# Patient Record
Sex: MALE | Race: OTHER | HISPANIC OR LATINO | Employment: FULL TIME | URBAN - METROPOLITAN AREA
[De-identification: names, ages, dates, MRNs, and addresses within clinical notes are randomized per-mention and may not be internally consistent; named-entity substitution may affect disease eponyms.]

---

## 2023-12-04 ENCOUNTER — HOSPITAL ENCOUNTER (EMERGENCY)
Facility: HOSPITAL | Age: 23
Discharge: HOME | End: 2023-12-04
Attending: STUDENT IN AN ORGANIZED HEALTH CARE EDUCATION/TRAINING PROGRAM

## 2023-12-04 VITALS
BODY MASS INDEX: 20.73 KG/M2 | HEART RATE: 83 BPM | SYSTOLIC BLOOD PRESSURE: 125 MMHG | OXYGEN SATURATION: 97 % | DIASTOLIC BLOOD PRESSURE: 55 MMHG | WEIGHT: 140 LBS | HEIGHT: 69 IN | TEMPERATURE: 99 F | RESPIRATION RATE: 14 BRPM

## 2023-12-04 DIAGNOSIS — J02.0 STREP PHARYNGITIS: Primary | ICD-10-CM

## 2023-12-04 LAB
CTP QC/QA: YES
MOLECULAR STREP A: POSITIVE
POC MOLECULAR INFLUENZA A AGN: NEGATIVE
POC MOLECULAR INFLUENZA B AGN: NEGATIVE
SARS-COV-2 RDRP RESP QL NAA+PROBE: NEGATIVE

## 2023-12-04 PROCEDURE — 87635 SARS-COV-2 COVID-19 AMP PRB: CPT | Performed by: NURSE PRACTITIONER

## 2023-12-04 PROCEDURE — 87651 STREP A DNA AMP PROBE: CPT

## 2023-12-04 PROCEDURE — 87502 INFLUENZA DNA AMP PROBE: CPT

## 2023-12-04 PROCEDURE — 25000003 PHARM REV CODE 250: Performed by: STUDENT IN AN ORGANIZED HEALTH CARE EDUCATION/TRAINING PROGRAM

## 2023-12-04 PROCEDURE — 99284 EMERGENCY DEPT VISIT MOD MDM: CPT

## 2023-12-04 RX ORDER — CEPHALEXIN 500 MG/1
500 CAPSULE ORAL 2 TIMES DAILY
Qty: 20 CAPSULE | Refills: 0 | Status: SHIPPED | OUTPATIENT
Start: 2023-12-04 | End: 2023-12-14

## 2023-12-04 RX ORDER — NAPROXEN 500 MG/1
500 TABLET ORAL EVERY 12 HOURS PRN
Qty: 14 TABLET | Refills: 0 | Status: SHIPPED | OUTPATIENT
Start: 2023-12-04 | End: 2023-12-11

## 2023-12-04 RX ORDER — NAPROXEN 500 MG/1
500 TABLET ORAL
Status: COMPLETED | OUTPATIENT
Start: 2023-12-04 | End: 2023-12-04

## 2023-12-04 RX ADMIN — NAPROXEN 500 MG: 500 TABLET ORAL at 11:12

## 2023-12-04 NOTE — Clinical Note
"Aleksey Nevarez" Sunil Ch was seen and treated in our emergency department on 12/4/2023.  He may return to work on 12/06/2023.       If you have any questions or concerns, please don't hesitate to call.      Niesha Khan RN    "

## 2023-12-05 NOTE — ED TRIAGE NOTES
Patient presents to the ED c/o sore throat and lightheadedness ongoing over the past 2 days. Pt reports attempting treating s/s w/ Tylenol and NyQuil w/ last dose yesterday w/ some relief. Pt endorses sick contacts in the home w/ recent flu dx. Denies fevers, chills, N/V/D

## 2023-12-05 NOTE — ED NOTES
Bed: 08main  Expected date:   Expected time:   Means of arrival: Personal Transportation  Comments:

## 2023-12-05 NOTE — ED PROVIDER NOTES
Encounter Date: 12/4/2023    SCRIBE #1 NOTE: I, Yves Larson, am scribing for, and in the presence of,  Dave Heck MD.       History     Chief Complaint   Patient presents with    General Illness     Pt presents to the ED with c/o subjective fever x2 days and one episode of emesis 1 hour ago and is still nauseated. Last took nyquil one hour ago. States rest of his family at home is sick and members have been dx with flu     Aleksey Ch Jr. is a 23 y.o. male with no known PMHx, who presents to the ED for evaluation of a sore throat beginning 2 days ago. Patient reports complaints of a sore throat for 2 days, noting associated fatigue, lightheadedness, cough, and generalized body aches. He notes attempting treatment with nyquil and tylneol with no immediate relief noted. He reports recent sick contact. No other medications taken PTA. No alleviating or exacerbating factors noted. Denies fever, chills, abdominal pain, nausea, vomiting, diarrhea, or other associated symptoms. NKDA.     The history is provided by the patient. No  was used.     Review of patient's allergies indicates:  No Known Allergies  History reviewed. No pertinent past medical history.  History reviewed. No pertinent surgical history.  History reviewed. No pertinent family history.  Social History     Tobacco Use    Smoking status: Never    Smokeless tobacco: Never   Substance Use Topics    Alcohol use: Yes     Comment: socially    Drug use: Never     Review of Systems   Constitutional:  Positive for fatigue. Negative for chills and fever.   HENT:  Positive for sore throat. Negative for facial swelling.    Eyes:  Negative for visual disturbance.   Respiratory:  Positive for cough. Negative for shortness of breath.    Cardiovascular:  Negative for chest pain and palpitations.   Gastrointestinal:  Negative for abdominal pain, nausea and vomiting.   Genitourinary:  Negative for dysuria and hematuria.    Musculoskeletal:  Positive for myalgias. Negative for back pain.   Skin:  Negative for rash.   Neurological:  Positive for light-headedness. Negative for weakness and headaches.   Hematological:  Does not bruise/bleed easily.   Psychiatric/Behavioral: Negative.         Physical Exam     Initial Vitals [12/04/23 2038]   BP Pulse Resp Temp SpO2   124/75 81 16 98.1 °F (36.7 °C) 95 %      MAP       --         Physical Exam    Nursing note and vitals reviewed.  Constitutional: He appears well-developed and well-nourished. He is not diaphoretic. No distress.   HENT:   Head: Normocephalic and atraumatic.   Right Ear: External ear normal.   Left Ear: External ear normal.   Nose: Nose normal.   Mouth/Throat: Posterior oropharyngeal edema present. No oropharyngeal exudate or posterior oropharyngeal erythema.   Posterior oropharynx is symmetrical. Palatal petechiae noted.   Eyes: Conjunctivae are normal. No scleral icterus.   Neck: Neck supple. No tracheal deviation present.   Cardiovascular:  Normal rate, regular rhythm and normal heart sounds.     Exam reveals no gallop and no friction rub.       No murmur heard.  Pulmonary/Chest: Breath sounds normal. No respiratory distress.   Abdominal: Abdomen is soft. Bowel sounds are normal. There is no abdominal tenderness.   Musculoskeletal:      Cervical back: Neck supple.     Lymphadenopathy:     He has no cervical adenopathy.   Neurological: He is alert and oriented to person, place, and time. GCS score is 15. GCS eye subscore is 4. GCS verbal subscore is 5. GCS motor subscore is 6.   Skin: Skin is warm and dry.   Psychiatric: He has a normal mood and affect. Thought content normal.         ED Course   Procedures  Labs Reviewed   POCT STREP A MOLECULAR - Abnormal; Notable for the following components:       Result Value    Molecular Strep A, POC Positive (*)     All other components within normal limits   SARS-COV-2 RDRP GENE   POCT INFLUENZA A/B MOLECULAR          Imaging  Results    None          Medications   naproxen tablet 500 mg (500 mg Oral Given 12/4/23 5269)     Medical Decision Making  Amount and/or Complexity of Data Reviewed  Labs: ordered.    Risk  OTC drugs.  Prescription drug management.            Scribe Attestation:   Scribe #1: I performed the above scribed service and the documentation accurately describes the services I performed. I attest to the accuracy of the note.        ED Course as of 12/05/23 0151   Mon Dec 04, 2023   2253 Differential diagnosis includes but not limited to viral pharyngitis, streptococcal pharyngitis, viral syndrome, PTA [CC]   2253 23-year-old presenting to the emergency department for evaluation of sore throat.  He notes lightheadedness and generalized weakness.  He notes myalgias.  He denies fever, chills, shortness of breath, chest pain.  He notes mild cough.  Influenza COVID are negative.  Doubt these etiologies.  Rapid strep is positive.  Palatal petechiae noted erythematous posterior oropharynx.  I am concerned for streptococcal pharyngitis.  Plan to treat with antibiotics and have patient follow up in 7 days for re-evaluation.  No evidence of PTA on exam.  Normal phonation, symmetrical posterior oropharynx.  Patient otherwise looks well with normal vitals.  He is not appear toxic.  Strict return precautions given. I discussed with the patient/family the diagnosis, treatment plan, indications for return to the emergency department, and for expected follow-up. The patient/family verbalized an understanding. The patient/family is asked if there are any questions or concerns. We discuss the case, until all issues are addressed to the patient/family's satisfaction. Patient/family understands and is agreeable to the plan. Patient is stable and ready for discharge.      [CC]      ED Course User Index  [CC] Dave Heck MD                         IDave MD, personally performed the services described in this  documentation. All medical record entries made by the scribe were at my direction and in my presence. I have reviewed the chart and agree that the record reflects my personal performance and is accurate and complete.    Clinical Impression:  Final diagnoses:  [J02.0] Strep pharyngitis (Primary)          ED Disposition Condition    Discharge Stable          ED Prescriptions       Medication Sig Dispense Start Date End Date Auth. Provider    benzocaine-menthoL 15-3.6 mg Lozg 1 lozenge by Mucous Membrane route every 4 (four) hours as needed (sore throat). 18 lozenge 12/4/2023 -- Dave Heck MD    naproxen (NAPROSYN) 500 MG tablet Take 1 tablet (500 mg total) by mouth every 12 (twelve) hours as needed (pain). Take with meals. 14 tablet 12/4/2023 12/11/2023 Dave Heck MD    cephALEXin (KEFLEX) 500 MG capsule Take 1 capsule (500 mg total) by mouth 2 (two) times a day. for 10 days 20 capsule 12/4/2023 12/14/2023 Dave Heck MD          Follow-up Information       Follow up With Specialties Details Why Contact Info    [facility] - Emergency Dept Emergency Medicine Go to  If symptoms worsen [address] [facility] [place] [address] [phone]    Your PCP  Schedule an appointment as soon as possible for a visit in 2 days               Dave Heck MD  12/05/23 0151

## 2023-12-13 ENCOUNTER — HOSPITAL ENCOUNTER (EMERGENCY)
Facility: HOSPITAL | Age: 23
Discharge: HOME | End: 2023-12-14
Attending: EMERGENCY MEDICINE

## 2023-12-13 DIAGNOSIS — J06.9 VIRAL URI: Primary | ICD-10-CM

## 2023-12-13 DIAGNOSIS — M76.61 ACHILLES TENDINITIS OF RIGHT LOWER EXTREMITY: ICD-10-CM

## 2023-12-13 LAB
CTP QC/QA: YES
MOLECULAR STREP A: NEGATIVE
POC MOLECULAR INFLUENZA A AGN: NEGATIVE
POC MOLECULAR INFLUENZA B AGN: NEGATIVE
SARS-COV-2 RDRP RESP QL NAA+PROBE: NEGATIVE

## 2023-12-13 PROCEDURE — 87880 STREP A ASSAY W/OPTIC: CPT

## 2023-12-13 PROCEDURE — 87502 INFLUENZA DNA AMP PROBE: CPT

## 2023-12-13 PROCEDURE — 25000003 PHARM REV CODE 250

## 2023-12-13 PROCEDURE — 25000003 PHARM REV CODE 250: Performed by: STUDENT IN AN ORGANIZED HEALTH CARE EDUCATION/TRAINING PROGRAM

## 2023-12-13 PROCEDURE — 87635 SARS-COV-2 COVID-19 AMP PRB: CPT

## 2023-12-13 PROCEDURE — 99284 EMERGENCY DEPT VISIT MOD MDM: CPT

## 2023-12-13 RX ORDER — ONDANSETRON HYDROCHLORIDE 4 MG/5ML
4 SOLUTION ORAL ONCE
Status: DISCONTINUED | OUTPATIENT
Start: 2023-12-13 | End: 2023-12-13

## 2023-12-13 RX ORDER — ONDANSETRON 4 MG/1
4 TABLET, ORALLY DISINTEGRATING ORAL
Status: COMPLETED | OUTPATIENT
Start: 2023-12-13 | End: 2023-12-13

## 2023-12-13 RX ORDER — IBUPROFEN 600 MG/1
600 TABLET ORAL
Status: COMPLETED | OUTPATIENT
Start: 2023-12-13 | End: 2023-12-13

## 2023-12-13 RX ADMIN — ONDANSETRON 4 MG: 4 TABLET, ORALLY DISINTEGRATING ORAL at 10:12

## 2023-12-13 RX ADMIN — IBUPROFEN 600 MG: 600 TABLET ORAL at 10:12

## 2023-12-13 NOTE — Clinical Note
"Aleksey Nevarez" Sunil Ch was seen and treated in our emergency department on 12/13/2023.  He may return to work on 12/18/2023.       If you have any questions or concerns, please don't hesitate to call.      Pedro Pablo De La Rosa MD"

## 2023-12-14 VITALS
TEMPERATURE: 98 F | HEART RATE: 63 BPM | SYSTOLIC BLOOD PRESSURE: 111 MMHG | BODY MASS INDEX: 20.67 KG/M2 | WEIGHT: 140 LBS | RESPIRATION RATE: 16 BRPM | DIASTOLIC BLOOD PRESSURE: 56 MMHG | OXYGEN SATURATION: 97 %

## 2023-12-14 RX ORDER — ONDANSETRON 4 MG/1
4 TABLET, ORALLY DISINTEGRATING ORAL EVERY 8 HOURS PRN
Qty: 20 TABLET | Refills: 0 | Status: SHIPPED | OUTPATIENT
Start: 2023-12-14

## 2023-12-14 RX ORDER — ACETAMINOPHEN 500 MG
1000 TABLET ORAL EVERY 6 HOURS PRN
Qty: 60 TABLET | Refills: 0 | Status: SHIPPED | OUTPATIENT
Start: 2023-12-14

## 2023-12-14 RX ORDER — IBUPROFEN 600 MG/1
600 TABLET ORAL EVERY 6 HOURS PRN
Qty: 20 TABLET | Refills: 0 | Status: SHIPPED | OUTPATIENT
Start: 2023-12-14

## 2023-12-14 NOTE — ED NOTES
Bed: 02hall  Expected date:   Expected time:   Means of arrival: Personal Transportation  Comments:

## 2023-12-14 NOTE — ED PROVIDER NOTES
Encounter Date: 12/13/2023       History     Chief Complaint   Patient presents with    Vomiting     Starting yesterday and felt hot but did not check temp,     Leg Pain     Rt calf pain started today, denies injury, no swelling noted, ambulatory to triage     HPI    23-year-old male with no reported past medical history presents with right leg pain, vomiting and congestion since yesterday.  Patient reports some chills, states that he vomited a lot multiple times yesterday additionally this morning.  Reports he has not vomited again since then.  He does reports some loose bowel movements like diarrhea earlier today, no blood noted.  He reports some congestion, cough in his concern that he has a flu as he has not been feeling well for a few days now.  Reports no recent ill contacts he knows about.  Additionally while patient was walking upstairs today he noted immediate right calf pain in the middle of his calf without swelling, states that it is extremely difficult to flex or extend his right ankle and he has some tenderness throughout his Achilles.  Reports no history is with this previously, denies having changes foot where recently, denies any traumatic injury.  Denies any further complaints.    Review of patient's allergies indicates:   Allergen Reactions    Shrimp Nausea And Vomiting and Swelling     History reviewed. No pertinent past medical history.  History reviewed. No pertinent surgical history.  History reviewed. No pertinent family history.  Social History     Tobacco Use    Smoking status: Never    Smokeless tobacco: Never   Substance Use Topics    Alcohol use: Yes     Comment: socially    Drug use: Never     Review of Systems   Constitutional:  Positive for chills.   HENT:  Positive for congestion.    Eyes: Negative.    Respiratory:  Positive for cough.    Cardiovascular: Negative.    Gastrointestinal:  Positive for diarrhea, nausea and vomiting.   Genitourinary: Negative.    Musculoskeletal:  Negative.    Skin: Negative.    Neurological: Negative.        Physical Exam     Initial Vitals [12/13/23 2148]   BP Pulse Resp Temp SpO2   (!) 115/55 72 16 97.9 °F (36.6 °C) 97 %      MAP       --         Physical Exam    Nursing note and vitals reviewed.  Constitutional: He appears well-developed and well-nourished. He is not diaphoretic. No distress.   HENT:   Head: Normocephalic and atraumatic.   Nose: Nose normal.   Mouth/Throat: No oropharyngeal exudate.   Boggy nasal mucosa.   Eyes: EOM are normal. Pupils are equal, round, and reactive to light.   Neck: Neck supple. No tracheal deviation present. No JVD present.   Normal range of motion.  Cardiovascular:  Normal rate, regular rhythm, normal heart sounds and intact distal pulses.           Pulmonary/Chest: Breath sounds normal. No respiratory distress. He has no wheezes. He has no rhonchi. He has no rales.   Abdominal: Abdomen is soft. Bowel sounds are normal. He exhibits no distension. There is no abdominal tenderness. There is no rebound and no guarding.   Musculoskeletal:         General: Tenderness (tenderness to need right posterior calf extending down throughout Achilles tendon, negative Homans, 2+ DP pulses noted.) present. No edema. Normal range of motion.      Cervical back: Normal range of motion and neck supple.     Neurological: He is alert and oriented to person, place, and time. He has normal strength.   Skin: Skin is warm and dry. Capillary refill takes less than 2 seconds. No rash noted. No erythema.         ED Course   Procedures  Labs Reviewed   POCT INFLUENZA A/B MOLECULAR   SARS-COV-2 RDRP GENE   POCT STREP A MOLECULAR          Imaging Results    None          Medications   ibuprofen tablet 600 mg (600 mg Oral Given 12/13/23 2230)   ondansetron disintegrating tablet 4 mg (4 mg Oral Given 12/13/23 2230)     Medical Decision Making  Risk  OTC drugs.  Prescription drug management.      MDM:    23-year-old male with no reported past medical  history presents with right leg pain, vomiting and congestion since yesterday.  Physical exam as noted above.  ED workup notable for flu negative, strep negative, COVID negative.  Differential Diagnosis includes:  Achilles tendon rupture, DVT, pneumonia, appendicitis, sepsis.  Patient presentation consistent with viral URI negative viral swabs as noted above, constellation of symptoms appears more consistent with possible influenza versus viral URI.  He is tolerating oral liquids and fluids time, stable vitals.  Will continue supportive care.  Additionally patient noted to have significant tenderness throughout his right Achilles tendon with extremely difficult dorsal flexion and plantar flexion of the right ankle without significant overlying skin changes or significant edema noted.  EHL and FHL are intact, sensation is intact, he is neurovascularly intact distally in the right foot.  Will continue supportive care of a suspected Achilles tendinopathy on the right side.  Do not suspect any additional surgical or medical emergency. Discussed diagnosis and further treatment with patient, including f/u.  Return precautions given and all questions answered.  Patient in understanding of plan.  Pt discharged to home improved and stable.        Note was created using voice recognition software. Note may have occasional typographical or grammatical errors, garbled syntax, and other bizarre constructions that may not have been identified and edited despite good yeny initial review prior to signing.                                         Clinical Impression:  Final diagnoses:  [J06.9] Viral URI (Primary)  [M76.61] Achilles tendinitis of right lower extremity          ED Disposition Condition    Discharge Stable          ED Prescriptions       Medication Sig Dispense Start Date End Date Auth. Provider    ibuprofen (ADVIL,MOTRIN) 600 MG tablet Take 1 tablet (600 mg total) by mouth every 6 (six) hours as needed for Pain. 20  tablet 12/14/2023 -- Pedro Pablo De La Rosa MD    acetaminophen (TYLENOL) 500 MG tablet Take 2 tablets (1,000 mg total) by mouth every 6 (six) hours as needed. 60 tablet 12/14/2023 -- Pedro Pablo De La Rosa MD    ondansetron (ZOFRAN-ODT) 4 MG TbDL Take 1 tablet (4 mg total) by mouth every 8 (eight) hours as needed. 20 tablet 12/14/2023 -- Pedro Pablo De La Rosa MD          Follow-up Information       Follow up With Specialties Details Why Contact Info    [facility] - Emergency Dept Emergency Medicine Go to  If symptoms worsen [address] [facility] [place] [id number] [phone]    [facility]  Go in 1 week As needed [address] [place] [address]  [phone]      [place] [place] [facility] - Orthopedic Surgery, Physical Therapy   [address] [facility] [address]  [phone]               Pedro Pablo De La Rosa MD  12/14/23 0872

## 2023-12-14 NOTE — ED TRIAGE NOTES
Pt. Arrives to ED with complaints of right calf pain that started today, denying any recent injuries to the area, pain worse when ambulating. Also complaining of fever for the last 3-4 days, reports taking nyquil with no improvement of symptoms. Denies sick contacts.

## 2024-03-11 ENCOUNTER — HOSPITAL ENCOUNTER (EMERGENCY)
Facility: HOSPITAL | Age: 24
Discharge: HOME | End: 2024-03-11
Attending: EMERGENCY MEDICINE

## 2024-03-11 VITALS
SYSTOLIC BLOOD PRESSURE: 130 MMHG | HEART RATE: 75 BPM | DIASTOLIC BLOOD PRESSURE: 70 MMHG | OXYGEN SATURATION: 99 % | RESPIRATION RATE: 19 BRPM | BODY MASS INDEX: 21.62 KG/M2 | HEIGHT: 69 IN | TEMPERATURE: 98 F | WEIGHT: 146 LBS

## 2024-03-11 DIAGNOSIS — M76.60 PAIN IN ACHILLES TENDON: Primary | ICD-10-CM

## 2024-03-11 DIAGNOSIS — S99.911A RIGHT ANKLE INJURY: ICD-10-CM

## 2024-03-11 DIAGNOSIS — S90.01XA CONTUSION OF RIGHT ANKLE, INITIAL ENCOUNTER: ICD-10-CM

## 2024-03-11 PROCEDURE — 63600175 PHARM REV CODE 636 W HCPCS: Performed by: NURSE PRACTITIONER

## 2024-03-11 PROCEDURE — 99284 EMERGENCY DEPT VISIT MOD MDM: CPT | Mod: 25

## 2024-03-11 PROCEDURE — 96372 THER/PROPH/DIAG INJ SC/IM: CPT | Performed by: NURSE PRACTITIONER

## 2024-03-11 RX ORDER — DEXAMETHASONE SODIUM PHOSPHATE 4 MG/ML
12 INJECTION, SOLUTION INTRA-ARTICULAR; INTRALESIONAL; INTRAMUSCULAR; INTRAVENOUS; SOFT TISSUE
Status: COMPLETED | OUTPATIENT
Start: 2024-03-11 | End: 2024-03-11

## 2024-03-11 RX ORDER — NAPROXEN 500 MG/1
500 TABLET ORAL EVERY 12 HOURS PRN
Qty: 20 TABLET | Refills: 0 | Status: SHIPPED | OUTPATIENT
Start: 2024-03-11

## 2024-03-11 RX ADMIN — DEXAMETHASONE SODIUM PHOSPHATE 12 MG: 4 INJECTION, SOLUTION INTRAMUSCULAR; INTRAVENOUS at 07:03

## 2024-03-11 NOTE — Clinical Note
"Aleksey Nevarez" Sunil Ch was seen and treated in our emergency department on 3/11/2024.  He may return to work on 03/15/2024.       If you have any questions or concerns, please don't hesitate to call.      Sincere Glez, NP"

## 2024-03-11 NOTE — ED PROVIDER NOTES
Encounter Date: 3/11/2024       History     Chief Complaint   Patient presents with    Ankle Pain     Patient reports right ankle pain, states did get hit by a pipe 2 weeks ago but didn't bother him, states pain has progressed and getting worse.      Year old male presenting for evaluation of right ankle pain.  States he was struck by a pipe in the ankle while at work several weeks ago but did not have any pain following this incident.  He is uncertain if that incident is related to his current pain.  Denies any other injuries.  No medications or treatments attempted prior to arrival.  Denies pain in the foot, calf, knee.    The history is provided by the patient. No  was used.     Review of patient's allergies indicates:   Allergen Reactions    Shrimp Nausea And Vomiting and Swelling     No past medical history on file.  No past surgical history on file.  No family history on file.  Social History     Tobacco Use    Smoking status: Never    Smokeless tobacco: Never   Substance Use Topics    Alcohol use: Yes     Comment: socially    Drug use: Never     Review of Systems   Constitutional:  Negative for chills and fever.   HENT:  Negative for congestion, postnasal drip, rhinorrhea, sinus pressure and sore throat.    Eyes:  Negative for pain and redness.   Respiratory:  Negative for apnea, cough, choking, chest tightness, shortness of breath, wheezing and stridor.    Cardiovascular:  Negative for chest pain, palpitations and leg swelling.   Gastrointestinal:  Negative for abdominal pain, diarrhea, nausea and vomiting.   Genitourinary:  Negative for dysuria, flank pain, penile pain and urgency.   Musculoskeletal:  Positive for arthralgias. Negative for back pain, gait problem, joint swelling, myalgias, neck pain and neck stiffness.   Skin:  Negative for color change, pallor, rash and wound.   Neurological:  Negative for dizziness, tremors, seizures, syncope and light-headedness.    Psychiatric/Behavioral:  Negative for confusion. The patient is not nervous/anxious.        Physical Exam     Initial Vitals [03/11/24 1717]   BP Pulse Resp Temp SpO2   127/68 77 18 97.6 °F (36.4 °C) 97 %      MAP       --         Physical Exam    Nursing note and vitals reviewed.  Constitutional: He appears well-developed and well-nourished. He is not diaphoretic. No distress.   HENT:   Head: Normocephalic and atraumatic.   Right Ear: External ear normal.   Left Ear: External ear normal.   Nose: Nose normal.   Eyes: EOM are normal. Right eye exhibits no discharge. Left eye exhibits no discharge.   Neck: Neck supple. No tracheal deviation present.   Normal range of motion.  Cardiovascular:  Normal rate.           Pulmonary/Chest: No stridor. No respiratory distress.   Abdominal: Abdomen is soft. He exhibits no distension. There is no abdominal tenderness.   Musculoskeletal:         General: Tenderness present. Normal range of motion.      Cervical back: Normal range of motion and neck supple.      Comments: Active range of motion of the right ankle fully intact.  Pain is worse with passive dorsiflexion.  Pain is in the posterior ankle.  There is tenderness overlying the Achilles tendon.  No evidence of Achilles tendon rupture.  No erythema, warmth, swelling, bruising, deformity.  DP pulse 2 +.     Neurological: He is alert and oriented to person, place, and time. He has normal strength. No cranial nerve deficit.   Skin: Skin is warm and dry.   Psychiatric: He has a normal mood and affect. His behavior is normal. Judgment and thought content normal.         ED Course   Procedures  Labs Reviewed - No data to display       Imaging Results              X-Ray Ankle Complete Right (Final result)  Result time 03/11/24 18:28:46      Final result by Carl Pittman MD (03/11/24 18:28:46)                   Impression:      No acute osseous abnormality identified.      Electronically signed by: Carl Pittman  MD  Date:    03/11/2024  Time:    18:28               Narrative:    EXAMINATION:  XR ANKLE COMPLETE 3 VIEW RIGHT    CLINICAL HISTORY:  Unspecified injury of right ankle, initial encounter    TECHNIQUE:  AP, lateral, and oblique images of the right ankle were performed.    COMPARISON:  None    FINDINGS:  No evidence of acute displaced fracture, dislocation, or osseous destructive process.  Ankle mortise is maintained.                                       Medications   dexAMETHasone injection 12 mg (12 mg Intramuscular Given 3/11/24 1900)     Medical Decision Making  X-rays unremarkable.  Findings consistent with Achilles tendinitis.  Will treat symptomatically.  Educated on the condition.  Follow up with PCP/Orthopedics as needed.  ED return precautions given.  Shared decision-making with the patient.    Amount and/or Complexity of Data Reviewed  Radiology: ordered. Decision-making details documented in ED Course.    Risk  Prescription drug management.                                      Clinical Impression:  Final diagnoses:  [S99.911A] Right ankle injury  [M76.60] Pain in Achilles tendon (Primary)  [S90.01XA] Contusion of right ankle, initial encounter          ED Disposition Condition    Discharge Stable          ED Prescriptions       Medication Sig Dispense Start Date End Date Auth. Provider    naproxen (NAPROSYN) 500 MG tablet Take 1 tablet (500 mg total) by mouth every 12 (twelve) hours as needed (Pain). 20 tablet 3/11/2024 -- Sincere Glez, SANDRA          Follow-up Information       Follow up With Specialties Details Why Contact [facility] Emergency Dept Emergency Medicine Go to  If symptoms worsen, As needed [address] [facility] [place] [id number] [phone]             Sincere Glez, SANDRA  03/23/24 3391

## 2024-03-11 NOTE — DISCHARGE INSTRUCTIONS
Return in 1 week for ultrasound to rule out blood clot if pain has not improved.  Return sooner for any new or worsening symptoms.    Thank you for coming to our Emergency Department today. It is important to remember that some problems are difficult to diagnose and may not be found during your first visit. Be sure to follow up with your primary care doctor.  If you do not have one, you may contact the one listed on your discharge paperwork or you may also call the [facility] Appointment Desk at [phone] to schedule an appointment with one.     Return to the ER with any questions/concerns, new/concerning symptoms, worsening or failure to improve. Do not drive or make any important decisions for 24 hours if you have received any pain medications, sedatives or mood altering drugs during your ER visit.

## 2024-03-17 ENCOUNTER — HOSPITAL ENCOUNTER (EMERGENCY)
Facility: HOSPITAL | Age: 24
Discharge: HOME | End: 2024-03-17
Attending: EMERGENCY MEDICINE

## 2024-03-17 VITALS
BODY MASS INDEX: 20.73 KG/M2 | RESPIRATION RATE: 16 BRPM | HEART RATE: 70 BPM | SYSTOLIC BLOOD PRESSURE: 119 MMHG | OXYGEN SATURATION: 100 % | TEMPERATURE: 98 F | WEIGHT: 140 LBS | HEIGHT: 69 IN | DIASTOLIC BLOOD PRESSURE: 75 MMHG

## 2024-03-17 DIAGNOSIS — A08.4 VIRAL GASTROENTERITIS: Primary | ICD-10-CM

## 2024-03-17 DIAGNOSIS — R19.7 NAUSEA, VOMITING, AND DIARRHEA: ICD-10-CM

## 2024-03-17 DIAGNOSIS — R11.2 NAUSEA, VOMITING, AND DIARRHEA: ICD-10-CM

## 2024-03-17 LAB
ALBUMIN SERPL BCP-MCNC: 4.9 G/DL (ref 3.5–5.2)
ALP SERPL-CCNC: 51 U/L (ref 55–135)
ALT SERPL W/O P-5'-P-CCNC: 21 U/L (ref 10–44)
ANION GAP SERPL CALC-SCNC: 11 MMOL/L (ref 8–16)
AST SERPL-CCNC: 22 U/L (ref 10–40)
BASOPHILS # BLD AUTO: 0.04 K/UL (ref 0–0.2)
BASOPHILS NFR BLD: 0.5 % (ref 0–1.9)
BILIRUB SERPL-MCNC: 0.6 MG/DL (ref 0.1–1)
BILIRUB UR QL STRIP: NEGATIVE
BUN SERPL-MCNC: 16 MG/DL (ref 6–20)
CALCIUM SERPL-MCNC: 10 MG/DL (ref 8.7–10.5)
CHLORIDE SERPL-SCNC: 107 MMOL/L (ref 95–110)
CLARITY UR: CLEAR
CO2 SERPL-SCNC: 23 MMOL/L (ref 23–29)
COLOR UR: YELLOW
CREAT SERPL-MCNC: 0.8 MG/DL (ref 0.5–1.4)
CTP QC/QA: YES
CTP QC/QA: YES
DIFFERENTIAL METHOD BLD: ABNORMAL
EOSINOPHIL # BLD AUTO: 0 K/UL (ref 0–0.5)
EOSINOPHIL NFR BLD: 0.1 % (ref 0–8)
ERYTHROCYTE [DISTWIDTH] IN BLOOD BY AUTOMATED COUNT: 13.3 % (ref 11.5–14.5)
EST. GFR  (NO RACE VARIABLE): >60 ML/MIN/1.73 M^2
GLUCOSE SERPL-MCNC: 95 MG/DL (ref 70–110)
GLUCOSE UR QL STRIP: NEGATIVE
HCT VFR BLD AUTO: 45.5 % (ref 40–54)
HGB BLD-MCNC: 16.1 G/DL (ref 14–18)
HGB UR QL STRIP: NEGATIVE
IMM GRANULOCYTES # BLD AUTO: 0.02 K/UL (ref 0–0.04)
IMM GRANULOCYTES NFR BLD AUTO: 0.2 % (ref 0–0.5)
KETONES UR QL STRIP: ABNORMAL
LEUKOCYTE ESTERASE UR QL STRIP: NEGATIVE
LIPASE SERPL-CCNC: 11 U/L (ref 4–60)
LYMPHOCYTES # BLD AUTO: 1.2 K/UL (ref 1–4.8)
LYMPHOCYTES NFR BLD: 14.5 % (ref 18–48)
MCH RBC QN AUTO: 28.9 PG (ref 27–31)
MCHC RBC AUTO-ENTMCNC: 35.4 G/DL (ref 32–36)
MCV RBC AUTO: 82 FL (ref 82–98)
MONOCYTES # BLD AUTO: 0.5 K/UL (ref 0.3–1)
MONOCYTES NFR BLD: 6.6 % (ref 4–15)
NEUTROPHILS # BLD AUTO: 6.3 K/UL (ref 1.8–7.7)
NEUTROPHILS NFR BLD: 78.1 % (ref 38–73)
NITRITE UR QL STRIP: NEGATIVE
NRBC BLD-RTO: 0 /100 WBC
PH UR STRIP: 8 [PH] (ref 5–8)
PLATELET # BLD AUTO: 342 K/UL (ref 150–450)
PMV BLD AUTO: 11.1 FL (ref 9.2–12.9)
POC MOLECULAR INFLUENZA A AGN: NEGATIVE
POC MOLECULAR INFLUENZA B AGN: NEGATIVE
POTASSIUM SERPL-SCNC: 4.2 MMOL/L (ref 3.5–5.1)
PROT SERPL-MCNC: 8.2 G/DL (ref 6–8.4)
PROT UR QL STRIP: ABNORMAL
RBC # BLD AUTO: 5.58 M/UL (ref 4.6–6.2)
SARS-COV-2 RDRP RESP QL NAA+PROBE: NEGATIVE
SODIUM SERPL-SCNC: 141 MMOL/L (ref 136–145)
SP GR UR STRIP: 1.02 (ref 1–1.03)
URN SPEC COLLECT METH UR: ABNORMAL
UROBILINOGEN UR STRIP-ACNC: NEGATIVE EU/DL
WBC # BLD AUTO: 8.02 K/UL (ref 3.9–12.7)

## 2024-03-17 PROCEDURE — 87635 SARS-COV-2 COVID-19 AMP PRB: CPT | Performed by: NURSE PRACTITIONER

## 2024-03-17 PROCEDURE — 63600175 PHARM REV CODE 636 W HCPCS: Performed by: NURSE PRACTITIONER

## 2024-03-17 PROCEDURE — 87502 INFLUENZA DNA AMP PROBE: CPT

## 2024-03-17 PROCEDURE — 85025 COMPLETE CBC W/AUTO DIFF WBC: CPT | Performed by: NURSE PRACTITIONER

## 2024-03-17 PROCEDURE — 25000003 PHARM REV CODE 250: Performed by: NURSE PRACTITIONER

## 2024-03-17 PROCEDURE — 81003 URINALYSIS AUTO W/O SCOPE: CPT | Performed by: NURSE PRACTITIONER

## 2024-03-17 PROCEDURE — 99284 EMERGENCY DEPT VISIT MOD MDM: CPT | Mod: 25

## 2024-03-17 PROCEDURE — 80053 COMPREHEN METABOLIC PANEL: CPT | Performed by: NURSE PRACTITIONER

## 2024-03-17 PROCEDURE — 83690 ASSAY OF LIPASE: CPT | Performed by: NURSE PRACTITIONER

## 2024-03-17 PROCEDURE — 96361 HYDRATE IV INFUSION ADD-ON: CPT

## 2024-03-17 PROCEDURE — 96374 THER/PROPH/DIAG INJ IV PUSH: CPT

## 2024-03-17 RX ORDER — ONDANSETRON 4 MG/1
4 TABLET, ORALLY DISINTEGRATING ORAL EVERY 6 HOURS PRN
Qty: 20 TABLET | Refills: 0 | Status: SHIPPED | OUTPATIENT
Start: 2024-03-17

## 2024-03-17 RX ORDER — DICYCLOMINE HYDROCHLORIDE 20 MG/1
20 TABLET ORAL 2 TIMES DAILY
Qty: 20 TABLET | Refills: 0 | Status: SHIPPED | OUTPATIENT
Start: 2024-03-17

## 2024-03-17 RX ORDER — ONDANSETRON HYDROCHLORIDE 2 MG/ML
8 INJECTION, SOLUTION INTRAVENOUS
Status: COMPLETED | OUTPATIENT
Start: 2024-03-17 | End: 2024-03-17

## 2024-03-17 RX ADMIN — ONDANSETRON 8 MG: 2 INJECTION INTRAMUSCULAR; INTRAVENOUS at 04:03

## 2024-03-17 RX ADMIN — SODIUM CHLORIDE 1000 ML: 9 INJECTION, SOLUTION INTRAVENOUS at 04:03

## 2024-03-17 NOTE — ED TRIAGE NOTES
Pt reports NV x 2 days and wants a reevaluation of right foot sprain from prior ED visit.  Denies medical and surgical hx.

## 2024-03-17 NOTE — ED PROVIDER NOTES
Encounter Date: 3/17/2024       History     Chief Complaint   Patient presents with    Vomiting     Patient reports N/V that started last night      23-year-old male with no pertinent past medical history presenting for nausea and vomiting that began 2 days ago.  Reports difficulty keeping any food or fluids down.  Reports that he has been drinking water as much as possible.  Also reports diarrhea.  No known sick contacts.  Denies fever, abdominal pain, constipation, chest pain, shortness of breath, or any other associated symptoms.  He has not taken any medications or attempted any other treatments for his symptoms.    The history is provided by the patient. No  was used.     Review of patient's allergies indicates:   Allergen Reactions    Shrimp Nausea And Vomiting and Swelling     History reviewed. No pertinent past medical history.  History reviewed. No pertinent surgical history.  History reviewed. No pertinent family history.  Social History     Tobacco Use    Smoking status: Never    Smokeless tobacco: Never   Substance Use Topics    Alcohol use: Yes     Comment: socially    Drug use: Never     Review of Systems   Constitutional:  Negative for chills and fever.   HENT:  Negative for congestion, postnasal drip, rhinorrhea, sinus pressure and sore throat.    Eyes:  Negative for pain and redness.   Respiratory:  Negative for apnea, cough, choking, chest tightness, shortness of breath, wheezing and stridor.    Cardiovascular:  Negative for chest pain, palpitations and leg swelling.   Gastrointestinal:  Positive for diarrhea, nausea and vomiting. Negative for abdominal pain.   Genitourinary:  Negative for dysuria, flank pain, penile pain and urgency.   Musculoskeletal:  Negative for arthralgias, back pain, gait problem, joint swelling, myalgias, neck pain and neck stiffness.   Skin:  Negative for color change, pallor, rash and wound.   Neurological:  Negative for dizziness, tremors, seizures,  syncope and light-headedness.   Psychiatric/Behavioral:  Negative for confusion. The patient is not nervous/anxious.        Physical Exam     Initial Vitals [03/17/24 1442]   BP Pulse Resp Temp SpO2   (!) 124/58 71 18 97.7 °F (36.5 °C) 98 %      MAP       --         Physical Exam    Nursing note and vitals reviewed.  Constitutional: He appears well-developed and well-nourished. He is not diaphoretic. No distress.   HENT:   Head: Normocephalic and atraumatic.   Right Ear: External ear normal.   Left Ear: External ear normal.   Nose: Nose normal.   Eyes: EOM are normal. Right eye exhibits no discharge. Left eye exhibits no discharge.   Neck: Neck supple. No tracheal deviation present.   Normal range of motion.  Cardiovascular:  Normal rate.           Pulmonary/Chest: No stridor. No respiratory distress.   Abdominal: Abdomen is soft. He exhibits no distension. There is no abdominal tenderness.   No tenderness.  No rigidity or guarding   Musculoskeletal:         General: No tenderness. Normal range of motion.      Cervical back: Normal range of motion and neck supple.     Neurological: He is alert and oriented to person, place, and time. He has normal strength. No cranial nerve deficit.   Skin: Skin is warm and dry.   Psychiatric: He has a normal mood and affect. His behavior is normal. Judgment and thought content normal.         ED Course   Procedures  Labs Reviewed   CBC W/ AUTO DIFFERENTIAL - Abnormal; Notable for the following components:       Result Value    Gran % 78.1 (*)     Lymph % 14.5 (*)     All other components within normal limits   COMPREHENSIVE METABOLIC PANEL - Abnormal; Notable for the following components:    Alkaline Phosphatase 51 (*)     All other components within normal limits   URINALYSIS, REFLEX TO URINE CULTURE - Abnormal; Notable for the following components:    Protein, UA Trace (*)     Ketones, UA 1+ (*)     All other components within normal limits    Narrative:     Specimen  Source->Urine   LIPASE   SARS-COV-2 RDRP GENE   POCT INFLUENZA A/B MOLECULAR          Imaging Results    None          Medications   sodium chloride 0.9% bolus 1,000 mL 1,000 mL (1,000 mLs Intravenous New Bag 3/17/24 1638)   ondansetron injection 8 mg (8 mg Intravenous Given 3/17/24 1639)     Medical Decision Making  Differential diagnosis includes but is not limited to COVID-19, influenza, viral gastroenteritis, appendicitis, diverticulitis, colitis, bowel obstruction, bowel perforation, dehydration, electrolyte abnormality, others      HPI and physical exam as above.  Abdominal exam is benign greatly reducing the likelihood of emergent intra-abdominal pathology such as appendicitis.  COVID and flu negative.  CBC, CMP, lipase within normal limits.  Urinalysis without evidence of infection.  Considered imaging but do not feel it is warranted at this time given the above.  Likely viral gastroenteritis.  Will treat with Zofran and Bentyl.  Follow up with PCP.  ED return precautions given.  Shared decision-making with the patient    Amount and/or Complexity of Data Reviewed  External Data Reviewed: notes.  Labs: ordered. Decision-making details documented in ED Course.    Risk  Prescription drug management.                                      Clinical Impression:  Final diagnoses:  [A08.4] Viral gastroenteritis (Primary)  [R11.2, R19.7] Nausea, vomiting, and diarrhea          ED Disposition Condition    Discharge Stable          ED Prescriptions       Medication Sig Dispense Start Date End Date Auth. Provider    ondansetron (ZOFRAN-ODT) 4 MG TbDL Take 1 tablet (4 mg total) by mouth every 6 (six) hours as needed (Nausea). 20 tablet 3/17/2024 -- Sincere Glez, NP    dicyclomine (BENTYL) 20 mg tablet Take 1 tablet (20 mg total) by mouth 2 (two) times daily. 20 tablet 3/17/2024 -- Sincere Glez, SANDRA          Follow-up Information       Follow up With Specialties Details Why Contact Info    [facility]  Emergency Medicine Go to  If symptoms worsen, As needed [address] [facility] [place] [address] [phone]             Sincere Glez, NP  03/17/24 4276

## 2024-03-17 NOTE — Clinical Note
"Aleksey Nevarez" Sunil Ch was seen and treated in our emergency department on 3/17/2024.  He may return with no restrictions on 03/19/2024.       Sincerely,      Sincere Glez NP    "

## 2024-03-17 NOTE — DISCHARGE INSTRUCTIONS
